# Patient Record
Sex: FEMALE | Race: WHITE | NOT HISPANIC OR LATINO | URBAN - METROPOLITAN AREA
[De-identification: names, ages, dates, MRNs, and addresses within clinical notes are randomized per-mention and may not be internally consistent; named-entity substitution may affect disease eponyms.]

---

## 2018-01-22 ENCOUNTER — APPOINTMENT (OUTPATIENT)
Dept: WOMENS IMAGING | Facility: HOSPITAL | Age: 43
End: 2018-01-22

## 2018-01-22 PROCEDURE — 77067 SCR MAMMO BI INCL CAD: CPT | Performed by: RADIOLOGY

## 2018-01-29 ENCOUNTER — APPOINTMENT (OUTPATIENT)
Dept: WOMENS IMAGING | Facility: HOSPITAL | Age: 43
End: 2018-01-29

## 2018-01-29 PROCEDURE — 77065 DX MAMMO INCL CAD UNI: CPT | Performed by: RADIOLOGY

## 2018-01-29 PROCEDURE — 76641 ULTRASOUND BREAST COMPLETE: CPT | Performed by: RADIOLOGY

## 2019-02-07 ENCOUNTER — APPOINTMENT (OUTPATIENT)
Dept: WOMENS IMAGING | Facility: HOSPITAL | Age: 44
End: 2019-02-07

## 2019-02-07 PROCEDURE — 77063 BREAST TOMOSYNTHESIS BI: CPT | Performed by: RADIOLOGY

## 2019-02-07 PROCEDURE — 77067 SCR MAMMO BI INCL CAD: CPT | Performed by: RADIOLOGY

## 2020-02-10 ENCOUNTER — APPOINTMENT (OUTPATIENT)
Dept: WOMENS IMAGING | Facility: HOSPITAL | Age: 45
End: 2020-02-10

## 2020-02-10 PROCEDURE — 77063 BREAST TOMOSYNTHESIS BI: CPT | Performed by: RADIOLOGY

## 2020-02-10 PROCEDURE — 77067 SCR MAMMO BI INCL CAD: CPT | Performed by: RADIOLOGY

## 2021-04-21 ENCOUNTER — APPOINTMENT (OUTPATIENT)
Dept: WOMENS IMAGING | Facility: HOSPITAL | Age: 46
End: 2021-04-21

## 2021-04-21 PROCEDURE — 77067 SCR MAMMO BI INCL CAD: CPT | Performed by: RADIOLOGY

## 2021-04-21 PROCEDURE — 77063 BREAST TOMOSYNTHESIS BI: CPT | Performed by: RADIOLOGY

## 2021-10-12 NOTE — TELEPHONE ENCOUNTER
Hub to read:  Called patient to let her know that she will need to make an appointment and be seen before a refill can be sent in. Asked her to call bacl to schedule with one of the providers since Connie is on maternity leave.

## 2021-10-14 RX ORDER — ROSUVASTATIN CALCIUM 10 MG/1
TABLET, COATED ORAL
Qty: 30 TABLET | Refills: 5 | Status: SHIPPED | OUTPATIENT
Start: 2021-10-14 | End: 2021-11-10 | Stop reason: SDUPTHER

## 2021-10-14 NOTE — TELEPHONE ENCOUNTER
PT HAS APPT ON 10/28; ASKING FOR PARTIAL REFILL      Rx Refill Note  Requested Prescriptions     Pending Prescriptions Disp Refills   • rosuvastatin (CRESTOR) 10 MG tablet [Pharmacy Med Name: ROSUVASTATIN CALCIUM 10 MG TAB] 30 tablet 5     Sig: TAKE 1 TAB DAILY FOR CHOLESTEROL      Last office visit with prescribing clinician: Visit date not found      Next office visit with prescribing clinician: Visit date not found          {TIP  Is Refill Pharmacy correct?:23}  Samantha Burton MA  10/14/21, 11:35 EDT

## 2021-10-14 NOTE — TELEPHONE ENCOUNTER
PATIENT CALLED, MADE APPT WITH DR MACIAS FOR 10/28 SINCE HAS WORK CONFLICTS NEXT WEEK, REQUESTED A PARTIAL REFILL OF ROSUVASTATIN CALCIUM 10 MG TAB UNTIL NEXT APPT.    PLEASE ADVISE    [phone]

## 2021-11-10 ENCOUNTER — OFFICE VISIT (OUTPATIENT)
Dept: INTERNAL MEDICINE | Facility: CLINIC | Age: 46
End: 2021-11-10

## 2021-11-10 VITALS
HEIGHT: 68 IN | BODY MASS INDEX: 36.07 KG/M2 | TEMPERATURE: 97.1 F | SYSTOLIC BLOOD PRESSURE: 156 MMHG | WEIGHT: 238 LBS | RESPIRATION RATE: 16 BRPM | HEART RATE: 82 BPM | DIASTOLIC BLOOD PRESSURE: 96 MMHG | OXYGEN SATURATION: 98 %

## 2021-11-10 DIAGNOSIS — Z11.59 ENCOUNTER FOR HCV SCREENING TEST FOR LOW RISK PATIENT: ICD-10-CM

## 2021-11-10 DIAGNOSIS — E78.2 MIXED HYPERLIPIDEMIA: Primary | ICD-10-CM

## 2021-11-10 DIAGNOSIS — Z23 ENCOUNTER FOR IMMUNIZATION: ICD-10-CM

## 2021-11-10 DIAGNOSIS — R03.0 SINGLE EPISODE OF ELEVATED BLOOD PRESSURE: ICD-10-CM

## 2021-11-10 DIAGNOSIS — Z11.4 ENCOUNTER FOR SCREENING FOR HIV: ICD-10-CM

## 2021-11-10 PROCEDURE — 90686 IIV4 VACC NO PRSV 0.5 ML IM: CPT | Performed by: INTERNAL MEDICINE

## 2021-11-10 PROCEDURE — 90471 IMMUNIZATION ADMIN: CPT | Performed by: INTERNAL MEDICINE

## 2021-11-10 PROCEDURE — 99204 OFFICE O/P NEW MOD 45 MIN: CPT | Performed by: INTERNAL MEDICINE

## 2021-11-10 RX ORDER — ROSUVASTATIN CALCIUM 10 MG/1
10 TABLET, COATED ORAL NIGHTLY
Qty: 90 TABLET | Refills: 5 | Status: SHIPPED | OUTPATIENT
Start: 2021-11-10 | End: 2023-03-24 | Stop reason: SDUPTHER

## 2021-11-10 NOTE — PROGRESS NOTES
"Chief Complaint  Follow-up, Med Refill, and Hyperlipidemia    Subjective          Kimmy Griffin presents to [facility]  HLD, HTN, doing well, no chest pain, sob, headaches, vision changes, no lows; not checking BP regularly at home      Objective   Vital Signs:   /96   Pulse 82   Temp 97.1 °F (36.2 °C)   Resp 16   Ht 172.7 cm (68\")   Wt 108 kg (238 lb)   SpO2 98%   BMI 36.19 kg/m²     Physical Exam  Vitals and nursing note reviewed.   Constitutional:       Appearance: Normal appearance.   HENT:      Head: Normocephalic and atraumatic.      Right Ear: Tympanic membrane, ear canal and external ear normal.      Left Ear: Tympanic membrane, ear canal and external ear normal.      Nose: Nose normal.      Mouth/Throat:      Mouth: Mucous membranes are moist.      Pharynx: Oropharynx is clear.   Eyes:      Extraocular Movements: Extraocular movements intact.      Conjunctiva/sclera: Conjunctivae normal.      Pupils: Pupils are equal, round, and reactive to light.   Cardiovascular:      Rate and Rhythm: Normal rate and regular rhythm.      Pulses: Normal pulses.      Heart sounds: Normal heart sounds.   Pulmonary:      Effort: Pulmonary effort is normal.      Breath sounds: Normal breath sounds. No wheezing, rhonchi or rales.   Abdominal:      General: Abdomen is flat. There is no distension.      Palpations: Abdomen is soft.      Tenderness: There is no abdominal tenderness.   Musculoskeletal:         General: Normal range of motion.      Cervical back: Normal range of motion and neck supple.      Right lower leg: No edema.      Left lower leg: No edema.   Skin:     General: Skin is warm and dry.      Capillary Refill: Capillary refill takes less than 2 seconds.      Findings: No rash.   Neurological:      General: No focal deficit present.      Mental Status: She is alert and oriented to person, place, and time. Mental status is at baseline.   Psychiatric:         " Mood and Affect: Mood normal.         Behavior: Behavior normal.        Result Review :                 Assessment and Plan    Diagnoses and all orders for this visit:    1. Mixed hyperlipidemia (Primary)  -     Comprehensive Metabolic Panel  -     Lipid Panel With / Chol / HDL Ratio  -     Hemoglobin A1c    2. Single episode of elevated blood pressure  -     Hemoglobin A1c    3. Encounter for screening for HIV  -     HIV-1 / O / 2 Ag / Antibody 4th Generation    4. Encounter for HCV screening test for low risk patient  -     Hepatitis C Antibody    Other orders  -     FluLaval/Fluarix/Fluzone >6 Months ([id number])    - check labs as above  - continue rosuvastatin 10mg daily, discussed risks and benefits  - rtc t ofollow up, worsening, change in illness  - counseled on goal BP, she will keep log and rtc to follow up few months for wellness exam      Follow Up   No follow-ups on file.  Patient was given instructions and counseling regarding her condition or for health maintenance advice. Please see specific information pulled into the AVS if appropriate.       Answers for HPI/ROS submitted by the patient on 11/10/2021  Please describe your symptoms.: Regular check-up to renew my cholesterol medicine.  Have you had these symptoms before?: No  How long have you been having these symptoms?: Greater than 2 weeks  Please list any medications you are currently taking for this condition.: Cholestrol medicine  What is the primary reason for your visit?: Other

## 2021-11-11 LAB
ALBUMIN SERPL-MCNC: 4.7 G/DL (ref 3.8–4.8)
ALBUMIN/GLOB SERPL: 1.7 {RATIO} (ref 1.2–2.2)
ALP SERPL-CCNC: 82 IU/L (ref 44–121)
ALT SERPL-CCNC: 32 IU/L (ref 0–32)
AST SERPL-CCNC: 24 IU/L (ref 0–40)
BILIRUB SERPL-MCNC: 0.5 MG/DL (ref 0–1.2)
BUN SERPL-MCNC: 14 MG/DL (ref 6–24)
BUN/CREAT SERPL: 19 (ref 9–23)
CALCIUM SERPL-MCNC: 9.4 MG/DL (ref 8.7–10.2)
CHLORIDE SERPL-SCNC: 104 MMOL/L (ref 96–106)
CHOLEST SERPL-MCNC: 203 MG/DL (ref 100–199)
CHOLEST/HDLC SERPL: 5.3 RATIO (ref 0–4.4)
CO2 SERPL-SCNC: 23 MMOL/L (ref 20–29)
CREAT SERPL-MCNC: 0.73 MG/DL (ref 0.57–1)
GLOBULIN SER CALC-MCNC: 2.7 G/DL (ref 1.5–4.5)
GLUCOSE SERPL-MCNC: 107 MG/DL (ref 65–99)
HBA1C MFR BLD: 5.7 % (ref 4.8–5.6)
HCV AB S/CO SERPL IA: <0.1 S/CO RATIO (ref 0–0.9)
HDLC SERPL-MCNC: 38 MG/DL
HIV 1+2 AB+HIV1 P24 AG SERPL QL IA: NON REACTIVE
LDLC SERPL CALC-MCNC: 132 MG/DL (ref 0–99)
Lab: NORMAL
POTASSIUM SERPL-SCNC: 4.3 MMOL/L (ref 3.5–5.2)
PROT SERPL-MCNC: 7.4 G/DL (ref 6–8.5)
SODIUM SERPL-SCNC: 142 MMOL/L (ref 134–144)
TRIGL SERPL-MCNC: 183 MG/DL (ref 0–149)
VLDLC SERPL CALC-MCNC: 33 MG/DL (ref 5–40)

## 2022-08-03 ENCOUNTER — APPOINTMENT (OUTPATIENT)
Dept: WOMENS IMAGING | Facility: HOSPITAL | Age: 47
End: 2022-08-03

## 2022-08-03 PROCEDURE — 77067 SCR MAMMO BI INCL CAD: CPT | Performed by: RADIOLOGY

## 2022-08-03 PROCEDURE — 77063 BREAST TOMOSYNTHESIS BI: CPT | Performed by: RADIOLOGY

## 2023-03-23 ENCOUNTER — OFFICE VISIT (OUTPATIENT)
Dept: INTERNAL MEDICINE | Facility: CLINIC | Age: 48
End: 2023-03-23
Payer: COMMERCIAL

## 2023-03-23 VITALS
HEART RATE: 92 BPM | DIASTOLIC BLOOD PRESSURE: 92 MMHG | WEIGHT: 242 LBS | HEIGHT: 68 IN | TEMPERATURE: 97.2 F | RESPIRATION RATE: 16 BRPM | SYSTOLIC BLOOD PRESSURE: 140 MMHG | BODY MASS INDEX: 36.68 KG/M2 | OXYGEN SATURATION: 99 %

## 2023-03-23 DIAGNOSIS — E78.2 MIXED HYPERLIPIDEMIA: Primary | ICD-10-CM

## 2023-03-23 DIAGNOSIS — R03.0 ELEVATED BLOOD PRESSURE READING IN OFFICE WITHOUT DIAGNOSIS OF HYPERTENSION: ICD-10-CM

## 2023-03-23 DIAGNOSIS — R73.03 PREDIABETES: ICD-10-CM

## 2023-03-23 LAB
ALBUMIN SERPL-MCNC: 4.9 G/DL (ref 3.5–5.2)
ALBUMIN/GLOB SERPL: 2.1 G/DL
ALP SERPL-CCNC: 81 U/L (ref 39–117)
ALT SERPL-CCNC: 27 U/L (ref 1–33)
AST SERPL-CCNC: 18 U/L (ref 1–32)
BILIRUB SERPL-MCNC: 0.4 MG/DL (ref 0–1.2)
BUN SERPL-MCNC: 18 MG/DL (ref 6–20)
BUN/CREAT SERPL: 25 (ref 7–25)
CALCIUM SERPL-MCNC: 9.5 MG/DL (ref 8.6–10.5)
CHLORIDE SERPL-SCNC: 105 MMOL/L (ref 98–107)
CHOLEST SERPL-MCNC: 231 MG/DL (ref 0–200)
CO2 SERPL-SCNC: 24.9 MMOL/L (ref 22–29)
CREAT SERPL-MCNC: 0.72 MG/DL (ref 0.57–1)
EGFRCR SERPLBLD CKD-EPI 2021: 102.6 ML/MIN/1.73
GLOBULIN SER CALC-MCNC: 2.3 GM/DL
GLUCOSE SERPL-MCNC: 108 MG/DL (ref 65–99)
HBA1C MFR BLD: 5.8 % (ref 4.8–5.6)
HDLC SERPL-MCNC: 36 MG/DL (ref 40–60)
LDLC SERPL CALC-MCNC: 159 MG/DL (ref 0–100)
POTASSIUM SERPL-SCNC: 4.2 MMOL/L (ref 3.5–5.2)
PROT SERPL-MCNC: 7.2 G/DL (ref 6–8.5)
SODIUM SERPL-SCNC: 142 MMOL/L (ref 136–145)
TRIGL SERPL-MCNC: 196 MG/DL (ref 0–150)
VLDLC SERPL CALC-MCNC: 36 MG/DL (ref 5–40)

## 2023-03-23 NOTE — ASSESSMENT & PLAN NOTE
CONTROLLED  - FLP today for ongoing monitoring  - cont on current high intensity statin, will adjust dose as indicated based on labs  - cont with good diet and lifestyle changes including increased exercise, low chol and low fat diet, and increased fiber  - has had some hyperglycemia with statins in the past, monitoring closely on new statin to see if that is still the case

## 2023-03-23 NOTE — PROGRESS NOTES
"Chief Complaint  Follow-up and Hyperlipidemia    Subjective          Kimmy Griffin presents to [facility] for follow up and med refills. Pt taking all medications daily as prescribed with good reported compliance. No issues or side effects with meds.   Pt has been off and on chol medication for a while, most recently started back 6-12 months ago, maybe longer. No side effects from meds.     Objective   Vital Signs:     /92   Pulse 92   Temp 97.2 °F (36.2 °C)   Resp 16   Ht 172.7 cm (68\")   Wt 110 kg (242 lb)   SpO2 99%   BMI 36.80 kg/m²     Physical Exam  Vitals and nursing note reviewed.   Constitutional:       General: She is not in acute distress.     Appearance: Normal appearance.   Cardiovascular:      Rate and Rhythm: Normal rate and regular rhythm.      Pulses: Normal pulses.      Heart sounds: Normal heart sounds. No murmur heard.  Pulmonary:      Effort: Pulmonary effort is normal. No respiratory distress.      Breath sounds: Normal breath sounds.   Abdominal:      General: Abdomen is flat. Bowel sounds are normal.      Palpations: Abdomen is soft.      Tenderness: There is no abdominal tenderness.   Musculoskeletal:      Right lower leg: No edema.      Left lower leg: No edema.   Neurological:      Mental Status: She is alert and oriented to person, place, and time. Mental status is at baseline.   Psychiatric:         Mood and Affect: Mood normal.         Behavior: Behavior normal.          Result Review :   The following data was reviewed by: Christine Rosales MD on 03/23/2023:  Labs:  Comprehensive Metabolic Panel (11/10/2021 00:00)   Lipid Panel With / Chol / HDL Ratio (11/10/2021 00:00)   Hemoglobin A1c (11/10/2021 00:00)              Assessment and Plan      Diagnoses and all orders for this visit:    1. Mixed hyperlipidemia (Primary)  Assessment & Plan:  CONTROLLED  - FLP today for ongoing monitoring  - cont on current high intensity statin, " will adjust dose as indicated based on labs  - cont with good diet and lifestyle changes including increased exercise, low chol and low fat diet, and increased fiber  - has had some hyperglycemia with statins in the past, monitoring closely on new statin to see if that is still the case      Orders:  -     Comprehensive Metabolic Panel  -     Lipid Panel    2. Prediabetes  -     Comprehensive Metabolic Panel  -     Hemoglobin A1c    3. Elevated blood pressure reading in office without diagnosis of hypertension    - BP slightly elevated in office today, actually down from last reading, pt suspects it is mostly related to white coat HTN  - will begin monitoring routinely at home and call if values trend >140/90    Follow Up   Return in about 6 months (around 9/23/2023) for Annual physical.    Patient was given instructions and counseling regarding her condition or for health maintenance advice. Please see specific information pulled into the AVS if appropriate.     Rivka Rosales MD  [facility]  Phone: [phone]  Fax: [phone]

## 2023-03-24 RX ORDER — ROSUVASTATIN CALCIUM 10 MG/1
10 TABLET, COATED ORAL NIGHTLY
Qty: 90 TABLET | Refills: 3 | Status: SHIPPED | OUTPATIENT
Start: 2023-03-24

## 2023-11-01 ENCOUNTER — APPOINTMENT (OUTPATIENT)
Dept: WOMENS IMAGING | Facility: HOSPITAL | Age: 48
End: 2023-11-01
Payer: COMMERCIAL

## 2023-11-01 PROCEDURE — 77067 SCR MAMMO BI INCL CAD: CPT | Performed by: RADIOLOGY

## 2023-11-01 PROCEDURE — 77063 BREAST TOMOSYNTHESIS BI: CPT | Performed by: RADIOLOGY

## 2024-05-03 ENCOUNTER — OFFICE VISIT (OUTPATIENT)
Dept: FAMILY MEDICINE CLINIC | Facility: CLINIC | Age: 49
End: 2024-05-03
Payer: COMMERCIAL

## 2024-05-03 VITALS
WEIGHT: 228 LBS | SYSTOLIC BLOOD PRESSURE: 140 MMHG | HEART RATE: 60 BPM | HEIGHT: 68 IN | BODY MASS INDEX: 34.56 KG/M2 | DIASTOLIC BLOOD PRESSURE: 90 MMHG | TEMPERATURE: 97.7 F | OXYGEN SATURATION: 98 %

## 2024-05-03 DIAGNOSIS — Z83.719 FAMILY HISTORY OF POLYPS IN THE COLON: ICD-10-CM

## 2024-05-03 DIAGNOSIS — E78.2 MIXED HYPERLIPIDEMIA: ICD-10-CM

## 2024-05-03 DIAGNOSIS — Z12.11 COLON CANCER SCREENING: ICD-10-CM

## 2024-05-03 DIAGNOSIS — R03.0 ELEVATED BLOOD PRESSURE READING IN OFFICE WITHOUT DIAGNOSIS OF HYPERTENSION: ICD-10-CM

## 2024-05-03 DIAGNOSIS — R73.03 PREDIABETES: ICD-10-CM

## 2024-05-03 DIAGNOSIS — Z76.89 ENCOUNTER TO ESTABLISH CARE: ICD-10-CM

## 2024-05-03 DIAGNOSIS — Z00.00 ANNUAL PHYSICAL EXAM: Primary | ICD-10-CM

## 2024-05-03 RX ORDER — ROSUVASTATIN CALCIUM 10 MG/1
10 TABLET, COATED ORAL NIGHTLY
Qty: 90 TABLET | Refills: 3 | Status: SHIPPED | OUTPATIENT
Start: 2024-05-03

## 2024-05-03 NOTE — PROGRESS NOTES
Patient ID: Kimmy Griffin is a 48 y.o. female     Patient Care Team:  Head, MARICRUZ aYng as PCP - General (Nurse Practitioner)    Subjective     Chief Complaint   Patient presents with    [facility]    Annual Exam    Hyperlipidemia       History of Present Illness    Kimmy Griffin was a patient of Dr. Rosales's who is no longer with [facility].  I will be taking over this patient's primary care. Last seen 3/23/2023.  This is the first time patient is seeing me.     She presents to [facility] today for annual physical and management of hyperlipidemia.   C/O easy bruising to lower legs.  She does take aspirin 81 mg daily and has been for years.      Health Habits:  Dental Exam: Up to date  Eye Exam: Not UTD.    Diet:  Intermittent fasting - had lost 30 pounds in past.  Has lost 8-10 pounds since March.   Exercise: 3 times/week.  Current exercise activities include: walking  Pap:  All Women < 3 years. Early menopause.    Mammogram:  UTD.    Dexa: N/A    Colonoscopy: Never  - Intermittent issues of bleeding with hard stools.    Works from home as a .    She denies any complaints of fever, chills, cough, chest pain, shortness of air, abdominal pain, nausea, or any other concerns.     The following portions of the patient's history were reviewed and updated as appropriate: allergies, current medications, past family history, past medical history, past social history, past surgical history and problem list.       ROS    Vitals:    05/03/24 0926   BP: 140/90   Pulse: 60   Temp: 97.7 °F (36.5 °C)   SpO2: 98%       Documented weights    05/03/24 0926   Weight: 103 kg (228 lb)     Body mass index is 34.67 kg/m².    Results for orders placed or performed in visit on 03/23/23   Comprehensive Metabolic Panel    Specimen: Blood   Result Value Ref Range    Glucose 108 (H) 65 - 99 mg/dL    BUN 18 6 - 20 mg/dL    Creatinine 0.72 0.57 - 1.00 mg/dL    EGFR Result 102.6 >60.0  mL/min/1.73    BUN/Creatinine Ratio 25.0 7.0 - 25.0    Sodium 142 136 - 145 mmol/L    Potassium 4.2 3.5 - 5.2 mmol/L    Chloride 105 98 - 107 mmol/L    Total CO2 24.9 22.0 - 29.0 mmol/L    Calcium 9.5 8.6 - 10.5 mg/dL    Total Protein 7.2 6.0 - 8.5 g/dL    Albumin 4.9 3.5 - 5.2 g/dL    Globulin 2.3 gm/dL    A/G Ratio 2.1 g/dL    Total Bilirubin 0.4 0.0 - 1.2 mg/dL    Alkaline Phosphatase 81 39 - 117 U/L    AST (SGOT) 18 1 - 32 U/L    ALT (SGPT) 27 1 - 33 U/L   Hemoglobin A1c    Specimen: Blood   Result Value Ref Range    Hemoglobin A1C 5.80 (H) 4.80 - 5.60 %   Lipid Panel    Specimen: Blood   Result Value Ref Range    Total Cholesterol 231 (H) 0 - 200 mg/dL    Triglycerides 196 (H) 0 - 150 mg/dL    HDL Cholesterol 36 (L) 40 - 60 mg/dL    VLDL Cholesterol Javier 36 5 - 40 mg/dL    LDL Chol Calc (NIH) 159 (H) 0 - 100 mg/dL           Objective     Physical Exam  Constitutional:       Appearance: She is well-developed.   HENT:      Head: Normocephalic and atraumatic.      Right Ear: Tympanic membrane normal.      Left Ear: Tympanic membrane normal.   Eyes:      Pupils: Pupils are equal, round, and reactive to light.   Cardiovascular:      Rate and Rhythm: Normal rate and regular rhythm.      Heart sounds: Normal heart sounds. No murmur heard.     Comments: B/P rechecked at 142/88  Pulmonary:      Effort: Pulmonary effort is normal.      Breath sounds: Normal breath sounds. No wheezing, rhonchi or rales.   Abdominal:      General: Bowel sounds are normal.      Palpations: Abdomen is soft.      Tenderness: There is no abdominal tenderness.   Musculoskeletal:         General: No tenderness. Normal range of motion.      Cervical back: Normal range of motion and neck supple.   Skin:     General: Skin is warm and dry.      Findings: No erythema or rash.   Neurological:      Mental Status: She is alert and oriented to person, place, and time.   Psychiatric:         Behavior: Behavior normal.         BMI is >= 30 and <35. (Class 1  Obesity). The following options were offered after discussion;: exercise counseling/recommendations, nutrition counseling/recommendations, and information on healthy weight added to patient's after visit summary       Assessment & Plan     Assessment/Plan     Diagnoses and all orders for this visit:    1. Annual physical exam (Primary)  -     CBC (No Diff)  -     Comprehensive Metabolic Panel  -     Lipid Panel With / Chol / HDL Ratio  -     TSH Rfx On Abnormal To Free T4    2. Encounter to establish care    3. Mixed hyperlipidemia  -     Comprehensive Metabolic Panel  -     Lipid Panel With / Chol / HDL Ratio  -     rosuvastatin (CRESTOR) 10 MG tablet; Take 1 tablet by mouth Every Night.  Dispense: 90 tablet; Refill: 3    4. Prediabetes  -     Hemoglobin A1c    5. Colon cancer screening  -     Ambulatory Referral For Screening Colonoscopy    6. Family history of polyps in the colon  -     Ambulatory Referral For Screening Colonoscopy    7. Elevated blood pressure reading in office without diagnosis of hypertension        Summary:  Kimmy Griffin presented to office today to establish care with our practice.  She is fasting today.  Will obtain routine annual physical labs including hemoglobin A1c.  Will notify her of results.  No changes in medications at this time.  Advise she may stop baby aspirin due to easily bruising and problems with what sounds to be hemorrhoids.  Also advised to increase fiber and water intake to help with this.  She may also take MiraLAX as needed.  Placed order for screening colonoscopy.  Blood pressure slightly elevated in office today at 140/90.  She states will be elevated in doctors offices however stable at home.  Advised to continue to monitor blood pressure at home.  Let us know if it stays greater than 140/90.      Follow Up:  Return for Labs today - F/U 1 year physical  with fasting labs week before. .    In the meantime, instructed to contact us sooner for any problems or  concerns.    Patient was given instructions and counseling regarding condition or for health maintenance advice.  Please see specific information pulled into the AVS if appropriate.          Kimmy Ruiz, APRN  Family Medicine  [facility]  05/03/24  10:13 EDT

## 2024-05-04 LAB
ALBUMIN SERPL-MCNC: 4.9 G/DL (ref 3.5–5.2)
ALBUMIN/GLOB SERPL: 2 G/DL
ALP SERPL-CCNC: 81 U/L (ref 39–117)
ALT SERPL-CCNC: 29 U/L (ref 1–33)
AST SERPL-CCNC: 21 U/L (ref 1–32)
BILIRUB SERPL-MCNC: 0.4 MG/DL (ref 0–1.2)
BUN SERPL-MCNC: 17 MG/DL (ref 6–20)
BUN/CREAT SERPL: 25 (ref 7–25)
CALCIUM SERPL-MCNC: 9.5 MG/DL (ref 8.6–10.5)
CHLORIDE SERPL-SCNC: 103 MMOL/L (ref 98–107)
CHOLEST SERPL-MCNC: 206 MG/DL (ref 0–200)
CHOLEST/HDLC SERPL: 5.89 {RATIO}
CO2 SERPL-SCNC: 26.1 MMOL/L (ref 22–29)
CREAT SERPL-MCNC: 0.68 MG/DL (ref 0.57–1)
EGFRCR SERPLBLD CKD-EPI 2021: 106.3 ML/MIN/1.73
ERYTHROCYTE [DISTWIDTH] IN BLOOD BY AUTOMATED COUNT: 13.2 % (ref 12.3–15.4)
GLOBULIN SER CALC-MCNC: 2.4 GM/DL
GLUCOSE SERPL-MCNC: 105 MG/DL (ref 65–99)
HBA1C MFR BLD: 5.8 % (ref 4.8–5.6)
HCT VFR BLD AUTO: 40.9 % (ref 34–46.6)
HDLC SERPL-MCNC: 35 MG/DL (ref 40–60)
HGB BLD-MCNC: 13.5 G/DL (ref 12–15.9)
LDLC SERPL CALC-MCNC: 135 MG/DL (ref 0–100)
MCH RBC QN AUTO: 30.2 PG (ref 26.6–33)
MCHC RBC AUTO-ENTMCNC: 33 G/DL (ref 31.5–35.7)
MCV RBC AUTO: 91.5 FL (ref 79–97)
PLATELET # BLD AUTO: 220 10*3/MM3 (ref 140–450)
POTASSIUM SERPL-SCNC: 4.1 MMOL/L (ref 3.5–5.2)
PROT SERPL-MCNC: 7.3 G/DL (ref 6–8.5)
RBC # BLD AUTO: 4.47 10*6/MM3 (ref 3.77–5.28)
SODIUM SERPL-SCNC: 140 MMOL/L (ref 136–145)
TRIGL SERPL-MCNC: 200 MG/DL (ref 0–150)
TSH SERPL DL<=0.005 MIU/L-ACNC: 2.09 UIU/ML (ref 0.27–4.2)
VLDLC SERPL CALC-MCNC: 36 MG/DL (ref 5–40)
WBC # BLD AUTO: 4.84 10*3/MM3 (ref 3.4–10.8)

## 2024-10-07 ENCOUNTER — PREP FOR SURGERY (OUTPATIENT)
Dept: OTHER | Facility: HOSPITAL | Age: 49
End: 2024-10-07
Payer: COMMERCIAL

## 2024-10-07 DIAGNOSIS — Z12.11 SCREENING FOR COLON CANCER: Primary | ICD-10-CM

## 2024-11-13 PROBLEM — Z12.11 SCREENING FOR COLON CANCER: Status: ACTIVE | Noted: 2024-10-07

## 2025-01-09 ENCOUNTER — ANESTHESIA (OUTPATIENT)
Dept: GASTROENTEROLOGY | Facility: HOSPITAL | Age: 50
End: 2025-01-09
Payer: COMMERCIAL

## 2025-01-09 ENCOUNTER — HOSPITAL ENCOUNTER (OUTPATIENT)
Facility: HOSPITAL | Age: 50
Setting detail: HOSPITAL OUTPATIENT SURGERY
Discharge: HOME | End: 2025-01-09
Attending: SURGERY | Admitting: SURGERY
Payer: COMMERCIAL

## 2025-01-09 ENCOUNTER — ANESTHESIA EVENT (OUTPATIENT)
Dept: GASTROENTEROLOGY | Facility: HOSPITAL | Age: 50
End: 2025-01-09
Payer: COMMERCIAL

## 2025-01-09 VITALS
HEART RATE: 69 BPM | BODY MASS INDEX: 37.35 KG/M2 | OXYGEN SATURATION: 100 % | DIASTOLIC BLOOD PRESSURE: 85 MMHG | RESPIRATION RATE: 11 BRPM | WEIGHT: 238 LBS | SYSTOLIC BLOOD PRESSURE: 155 MMHG | HEIGHT: 67 IN

## 2025-01-09 DIAGNOSIS — Z12.11 SCREENING FOR COLON CANCER: ICD-10-CM

## 2025-01-09 PROBLEM — K63.5 COLON POLYPS: Status: ACTIVE | Noted: 2025-01-09

## 2025-01-09 PROCEDURE — S0260 H&P FOR SURGERY: HCPCS | Performed by: SURGERY

## 2025-01-09 PROCEDURE — 25010000002 PROPOFOL 1000 MG/100ML EMULSION: Performed by: ANESTHESIOLOGY

## 2025-01-09 PROCEDURE — 25810000003 LACTATED RINGERS PER 1000 ML: Performed by: SURGERY

## 2025-01-09 PROCEDURE — 25010000002 PROPOFOL 10 MG/ML EMULSION: Performed by: ANESTHESIOLOGY

## 2025-01-09 PROCEDURE — 45380 COLONOSCOPY AND BIOPSY: CPT | Performed by: SURGERY

## 2025-01-09 PROCEDURE — 45385 COLONOSCOPY W/LESION REMOVAL: CPT | Performed by: SURGERY

## 2025-01-09 PROCEDURE — 25010000002 LIDOCAINE 1 % SOLUTION: Performed by: ANESTHESIOLOGY

## 2025-01-09 PROCEDURE — 88305 TISSUE EXAM BY PATHOLOGIST: CPT | Performed by: SURGERY

## 2025-01-09 RX ORDER — PROPOFOL 10 MG/ML
INJECTION, EMULSION INTRAVENOUS AS NEEDED
Status: DISCONTINUED | OUTPATIENT
Start: 2025-01-09 | End: 2025-01-09 | Stop reason: SURG

## 2025-01-09 RX ORDER — LIDOCAINE HYDROCHLORIDE 10 MG/ML
INJECTION, SOLUTION INFILTRATION; PERINEURAL AS NEEDED
Status: DISCONTINUED | OUTPATIENT
Start: 2025-01-09 | End: 2025-01-09 | Stop reason: SURG

## 2025-01-09 RX ORDER — SODIUM CHLORIDE 0.9 % (FLUSH) 0.9 %
10 SYRINGE (ML) INJECTION AS NEEDED
Status: DISCONTINUED | OUTPATIENT
Start: 2025-01-09 | End: 2025-01-09 | Stop reason: HOSPADM

## 2025-01-09 RX ORDER — SODIUM CHLORIDE, SODIUM LACTATE, POTASSIUM CHLORIDE, CALCIUM CHLORIDE 600; 310; 30; 20 MG/100ML; MG/100ML; MG/100ML; MG/100ML
1000 INJECTION, SOLUTION INTRAVENOUS CONTINUOUS
Status: DISCONTINUED | OUTPATIENT
Start: 2025-01-09 | End: 2025-01-09 | Stop reason: HOSPADM

## 2025-01-09 RX ADMIN — PROPOFOL 250 MCG/KG/MIN: 10 INJECTION, EMULSION INTRAVENOUS at 10:29

## 2025-01-09 RX ADMIN — SODIUM CHLORIDE, SODIUM LACTATE, POTASSIUM CHLORIDE, CALCIUM CHLORIDE 1000 ML: 20; 30; 600; 310 INJECTION, SOLUTION INTRAVENOUS at 09:14

## 2025-01-09 RX ADMIN — LIDOCAINE HYDROCHLORIDE 40 MG: 10 INJECTION, SOLUTION INFILTRATION; PERINEURAL at 10:29

## 2025-01-09 RX ADMIN — PROPOFOL INJECTABLE EMULSION 150 MG: 10 INJECTION, EMULSION INTRAVENOUS at 10:29

## 2025-01-09 NOTE — H&P
General Surgery  History and Physical    CC: Screening for colon cancer    HPI: The patient is a pleasant 49 y.o. year-old lady who presents today for routine screening colonoscopy.  She has never previously undergone colonoscopic evaluation and denies any melena or hematochezia.  She has no family history of colon cancer.    Past Medical History:   GERD  Migraine headaches  Hyperlipidemia    Past Surgical History:   Cholecystectomy  [place] tooth extraction    Medications:   Medications Prior to Admission   Medication Sig Dispense Refill Last Dose/Taking    melatonin 1 MG tablet Take 3 tablets by mouth Every Night.   Taking    multivitamin with minerals (MULTIVITAMIN ADULT PO) Take 1 tablet by mouth Daily. PT HOLDING FOR SURGERY   1/8/2025 Morning    omeprazole (priLOSEC) 20 MG capsule Take 1 capsule by mouth Daily.   Taking    rosuvastatin (CRESTOR) 10 MG tablet Take 1 tablet by mouth Every Night. 90 tablet 3 Taking       Allergies: Codeine    Family History: No family history of gastrointestinal malignancy in her parents or siblings    Social History: , non-smoker, weekly social alcohol use    ROS: A comprehensive review of systems was conducted and negative for melena or hematochezia  All other systems reviewed and negative    Physical Exam:  Vitals:    01/09/25 0906   BP: 159/87   Pulse: 75   Resp: 11   SpO2: 97%   Height: 172 cm  Weight: 108 kg  BMI: 37  General: No acute distress, well-nourished & well-developed  HEAD: normocephalic, atraumatic  EYES: normal conjunctiva, sclera anicteric  EARS: grossly normal hearing  NECK: supple, no thyromegaly  CARDIOVASCULAR: regular rate and rhythm  RESPIRATORY: clear to auscultation bilaterally  GASTROINTESTINAL: soft, nontender, non-distended  PSYCHIATRIC: oriented x3, normal mood and affect    ASSESSMENT & PLAN  Mrs. Griffin is a 49-year-old lady here for routine screening colonoscopy.  She has been counseled on the risks of the procedure which include but are  not limited to bleeding, colon perforation, and missed pathology.  Despite these risks, she has consented to proceed.    Catalina Barney MD  General, Robotic, and Endoscopic Surgery  [facility] [place]    [address], [address]  [place] [address]  P: [phone]  F: [phone]

## 2025-01-09 NOTE — ANESTHESIA PREPROCEDURE EVALUATION
Anesthesia Evaluation     Patient summary reviewed and Nursing notes reviewed   NPO Solid Status: > 8 hours  NPO Liquid Status: > 2 hours           Airway   Mallampati: I  TM distance: >3 FB  Neck ROM: full  No difficulty expected  Dental - normal exam     Pulmonary - negative pulmonary ROS   Cardiovascular   Exercise tolerance: good (4-7 METS)    Rhythm: regular    (+) hyperlipidemia      Neuro/Psych- negative ROS  GI/Hepatic/Renal/Endo    (+) GERD    Musculoskeletal (-) negative ROS    Abdominal    Substance History - negative use     OB/GYN negative ob/gyn ROS         Other                    Anesthesia Plan    ASA 2     MAC     intravenous induction     Anesthetic plan, risks, benefits, and alternatives have been provided, discussed and informed consent has been obtained with: patient.    CODE STATUS:

## 2025-01-09 NOTE — DISCHARGE INSTRUCTIONS
For the next 24 hours patient needs to be with a responsible adult.    For 24 hours DO NOT drive, operate machinery, appliances, drink alcohol, make important decisions or sign legal documents.    Start with a light or bland diet if you are feeling sick to your stomach otherwise advance to regular diet as tolerated.    Follow recommendations on procedure report if provided by your doctor.    Call Dr Barney for problems [phone]    Problems may include but not limited to: large amounts of bleeding, trouble breathing, repeated vomiting, severe unrelieved pain, fever or chills.

## 2025-01-09 NOTE — OP NOTE
Operative Note :  Catalina Barney MD      Kimmy Griffin  1975    Procedure Date: 01/09/25    Pre-op Diagnosis:  Screening for colon cancer [Z12.11]    Post-Operative Diagnosis:  Colon polyps    Procedure:   Flexible colonoscopy to the cecum with hot snare polypectomy x5 and cold forcep polypectomy    Surgeon: Catalina Barney MD    Assistant: None    Anesthesia:  MAC (monitored anesthetic care)    Estimated Blood Loss: Minimal    Specimens:   Ascending colon polyp  Hepatic flexure polyp  Descending colon polyps x 2  Rectal polyps x 2    Complications: None    Indications:  Mrs. Griffin is a 49-year-old lady here for routine screening colonoscopy. She has been counseled on the risks of the procedure which include but are not limited to bleeding, colon perforation, and missed pathology. Despite these risks, she has consented to proceed.     Findings: 6 sessile colon polyps removed    Description of procedure:  The patient was brought to the endoscopy suite and jaclyn in the left lateral decubitus position.  Continuous propofol anesthesia was administered.  A surgical timeout was completed.  A digital rectal exam was performed, revealing no abnormalities.  An adult colonoscope was then inserted through the anus and passed under direct visualization to the level of the cecum.  The cecum was identified via the ileocecal valve as well as the appendiceal orifice.  The scope was then slowly withdrawn, examining all circumferential walls of the ascending, transverse, descending, and sigmoid colon.  There were 6 sessile colon polyps identified and removed.  The first was a 2 mm polyp of the ascending colon removed using the biopsy forceps.  Within the hepatic flexure there was a 5 mm polyp removed using the hot snare and retrieved.  Within the descending colon there were 2 separate 3 mm polyps each removed using the hot snare.  Within the rectum there were 2 separate 2 mm polyps each removed using the hot snare.  Along  the distal rectum the scope was retroflexed and showed no signs of hemorrhoidal disease.  The scope was then withdrawn and the colon desufflated.  The patient had a very good bowel prep and was transferred to the recovery area in stable condition.     Recommendations:  I will call the patient in 1 week or less with the pathology results of the 6 polyps removed as this will determine when the next screening colonoscopy will be due.    Catalina Barney MD  General, Robotic, and Endoscopic Surgery  [facility]    [address], [address]  [place] [address]  P: [phone]  F: [phone]

## 2025-01-09 NOTE — ANESTHESIA POSTPROCEDURE EVALUATION
Patient: Kimmy Griffin    Procedure Summary       Date: 01/09/25 Room / Location:  [facility] /  [facility]    Anesthesia Start: 1024 Anesthesia Stop: 1106    Procedure: COLONOSCOPY into cecum with cold biopsy polypectomy and hot snare polypectomies Diagnosis:       Screening for colon cancer      (Screening for colon cancer [Z12.11])    Surgeons: Catalina Barney MD Provider: Anjum Mederos MD    Anesthesia Type: MAC ASA Status: 2            Anesthesia Type: MAC    Vitals  Vitals Value Taken Time   /73 01/09/25 1104   Temp     Pulse 72 01/09/25 1106   Resp 16 01/09/25 1103   SpO2 99 % 01/09/25 1106   Vitals shown include unfiled device data.        Post Anesthesia Care and Evaluation    Patient location during evaluation: bedside  Patient participation: complete - patient participated  Level of consciousness: awake  Pain management: adequate    Airway patency: patent  Anesthetic complications: No anesthetic complications  PONV Status: none  Cardiovascular status: acceptable  Respiratory status: acceptable  Hydration status: acceptable  Post Neuraxial Block status: Motor and sensory function returned to baseline

## 2025-01-10 LAB
CYTO UR: NORMAL
LAB AP CASE REPORT: NORMAL
PATH REPORT.FINAL DX SPEC: NORMAL
PATH REPORT.GROSS SPEC: NORMAL

## 2025-01-15 ENCOUNTER — TELEPHONE (OUTPATIENT)
Dept: SURGERY | Facility: CLINIC | Age: 50
End: 2025-01-15
Payer: COMMERCIAL

## 2025-01-15 NOTE — TELEPHONE ENCOUNTER
I called Kimmy and discussed the benign pathology findings from her colonoscopy last week.  All 6 polyps returned as tubular adenomas.  I would recommend given the presence of 6 adenomatous polyps that she have a repeat screening colonoscopy in 3 years.  She expressed understanding.    Kayla, please update her health maintenance tab and recall pool to reflect a 3-year interval for colonoscopy.    Thanks,  PERRY

## 2025-03-03 ENCOUNTER — TELEPHONE (OUTPATIENT)
Dept: FAMILY MEDICINE CLINIC | Facility: CLINIC | Age: 50
End: 2025-03-03

## 2025-03-03 ENCOUNTER — OFFICE VISIT (OUTPATIENT)
Dept: FAMILY MEDICINE CLINIC | Facility: CLINIC | Age: 50
End: 2025-03-03
Payer: COMMERCIAL

## 2025-03-03 VITALS
TEMPERATURE: 97.6 F | HEIGHT: 67 IN | BODY MASS INDEX: 36.88 KG/M2 | WEIGHT: 235 LBS | DIASTOLIC BLOOD PRESSURE: 80 MMHG | OXYGEN SATURATION: 99 % | HEART RATE: 64 BPM | SYSTOLIC BLOOD PRESSURE: 128 MMHG

## 2025-03-03 DIAGNOSIS — S69.92XA HAND INJURY, LEFT, INITIAL ENCOUNTER: Primary | ICD-10-CM

## 2025-03-03 PROCEDURE — 99213 OFFICE O/P EST LOW 20 MIN: CPT | Performed by: NURSE PRACTITIONER

## 2025-03-03 NOTE — PROGRESS NOTES
"Subjective     Chief Complaint   Patient presents with    Fall     Hand pain - fell on left , bruised and puffy. Thumb area is still very sore        Kimmy ANISHA Griffin is a 49 y.o. female who presents for evaluation of left hand/finger pain. Onset was sudden, related to pulling dog away from her dog which was being attacked.. Mechanism of injury: contusion. The pain is moderate, worsens with movement, and is relieved by rest. There is no associated numbness, tingling in fingers. Evaluation to date: none. Treatment to date: OTC analgesics, ice, avoidance of offending activity.  Initial swelling and redness which has improved however pain and bruising remains.    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      The following portions of the patient's history were reviewed and updated as appropriate: allergies, current medications, past family history, past medical history, past social history, past surgical history, and problem list.      Objective    /80 (BP Location: Left arm, Patient Position: Sitting, Cuff Size: Adult)   Pulse 64   Temp 97.6 °F (36.4 °C)   Ht 170.2 cm (67\")   Wt 107 kg (235 lb)   SpO2 99%   BMI 36.81 kg/m²   Right hand:  normal exam, no swelling, tenderness, instability; ligaments intact, full ROM both hands, wrists, and finger joints   Left hand:  soft tissue tenderness and swelling at the dorsal and palmar aspect of left hand primarily at base of left thumb and radial pulse normal   Thumb to finger dexterity intact with mild discomfort.      Imaging:  X-ray left hand: ordered, but results not yet available     Assessment & Plan Hand sprain       Diagnosis Plan   1. Hand injury, left, initial encounter  XR Hand 3+ View Left          Natural history and expected course discussed. Questions answered.  Rest, ice, compression, and elevation (RICE) therapy.  Reduction in offending activity discussed.  Volar wrist splint to be worn when up.  Plain film x-rays per orders.  OTC analgesics as " needed.  Follow up in 2 weeks.prn.

## 2025-03-03 NOTE — TELEPHONE ENCOUNTER
Caller: Kimmy Griffin    Relationship to patient: Self    Best call back number:  Telephone Information:   Mobile [phone]         Patient is needing: PATIENT IS WANTING TO HAVE XRAY DONE AT [facility]. SHE IS GOING TO SELF PAY AND THAT IS CHEAPER THAN HER INSURANCE. PLEASE FAX ORDER TO   #[phone]

## 2025-04-24 DIAGNOSIS — E78.2 MIXED HYPERLIPIDEMIA: ICD-10-CM

## 2025-04-24 DIAGNOSIS — Z00.00 ANNUAL PHYSICAL EXAM: Primary | ICD-10-CM

## 2025-04-24 DIAGNOSIS — R73.03 PREDIABETES: ICD-10-CM

## 2025-04-26 DIAGNOSIS — E78.2 MIXED HYPERLIPIDEMIA: ICD-10-CM

## 2025-04-28 RX ORDER — ROSUVASTATIN CALCIUM 10 MG/1
10 TABLET, COATED ORAL
Qty: 90 TABLET | Refills: 0 | Status: SHIPPED | OUTPATIENT
Start: 2025-04-28

## 2025-07-11 ENCOUNTER — OFFICE VISIT (OUTPATIENT)
Dept: FAMILY MEDICINE CLINIC | Facility: CLINIC | Age: 50
End: 2025-07-11
Payer: COMMERCIAL

## 2025-07-11 VITALS
OXYGEN SATURATION: 98 % | BODY MASS INDEX: 36.88 KG/M2 | HEART RATE: 74 BPM | SYSTOLIC BLOOD PRESSURE: 130 MMHG | TEMPERATURE: 97.7 F | DIASTOLIC BLOOD PRESSURE: 80 MMHG | HEIGHT: 67 IN | WEIGHT: 235 LBS

## 2025-07-11 DIAGNOSIS — E78.2 MIXED HYPERLIPIDEMIA: ICD-10-CM

## 2025-07-11 DIAGNOSIS — K21.9 GASTROESOPHAGEAL REFLUX DISEASE, UNSPECIFIED WHETHER ESOPHAGITIS PRESENT: ICD-10-CM

## 2025-07-11 DIAGNOSIS — Z00.00 ANNUAL PHYSICAL EXAM: Primary | ICD-10-CM

## 2025-07-11 DIAGNOSIS — Z23 IMMUNIZATION DUE: ICD-10-CM

## 2025-07-11 DIAGNOSIS — R73.03 PREDIABETES: ICD-10-CM

## 2025-07-11 RX ORDER — OMEPRAZOLE 20 MG/1
20 CAPSULE, DELAYED RELEASE ORAL DAILY
Qty: 90 CAPSULE | Refills: 3 | Status: SHIPPED | OUTPATIENT
Start: 2025-07-11

## 2025-07-11 RX ORDER — ROSUVASTATIN CALCIUM 10 MG/1
10 TABLET, COATED ORAL
Qty: 90 TABLET | Refills: 3 | Status: SHIPPED | OUTPATIENT
Start: 2025-07-11

## 2025-07-11 NOTE — PATIENT INSTRUCTIONS
Weight Management:  Increase ounces of water intake to goal of 1/2 body weight per day.  Increase grams of protein intake to 1/2 body weight per day.    Limit sugar and carbohydrate intake.  Incorporate exercise into daily routine with goal of 150 minutes per week.        Antireflux measures and dietary modifications reviewed. Low acid diet reviewed. Keep head of bed elevated. Stop eating/drinking at least 3 hours prior to bedtime. Eliminate caffeine and carbonated beverages. Weight loss encouraged if BMI over 25.

## 2025-07-11 NOTE — PROGRESS NOTES
Chief Complaint   Patient presents with    Annual Exam    Hyperlipidemia       Patient Care Team:  Head, MARICRUZ Yang as PCP - General (Nurse Practitioner)    Subjective   Kimmy Griffin is a 50 y.o. female and is here for a yearly physical exam. The patient reports no problems.    History of Present Illness  The patient presents for a routine checkup.    She has been under significant stress due to her 's health issues, which has led to a decrease in her self-care over the past 2 months. This includes less physical activity and irregular eating habits. She is currently working from home as a . She has noticed a slight weight gain in recent months and an increase in her glucose levels. She enjoys walking and plans to incorporate more of it into her daily routine. She also intends to start using hand weights for exercise. She reports not drinking enough water and primarily consumes water and coffee. She is not experiencing any abdominal pain. She is taking melatonin and berberine sporadically.    She underwent a colonoscopy, during which polyps were discovered. She is now on a 3-year colonoscopy program. She experienced hard stools and occasional bleeding after constipation, which she believes was due to her aspirin use. Since discontinuing aspirin, she has not had any further bleeding. She is up-to-date with her Pap smears and mammograms. She went through early menopause in her 40s. She has a dental appointment scheduled for this month and typically visits the dentist twice a year. She plans to schedule an eye exam as she has been relying more on her reading glasses recently.    She continues to take omeprazole daily for indigestion, which she finds effective. However, she experienced some breakthrough symptoms last night after consuming ice cream late in the evening.    She sustained a left hand injury in 02/2025, which has improved but not fully healed. She still experiences discomfort in the  thumb area, particularly when it is pulled or snagged. She is left-handed but the injury has not affected her work. She reports no numbness or tingling in her fingers. She has not used topical Biofreeze or Voltaren gel for her hand. She has a brace but finds it bulky and does not wear it often.    She has noticed discoloration in her second toe, which she believes is due to jamming it. She thinks it is still healing.    She is taking Crestor for cholesterol management and eats avocado daily.    SOCIAL HISTORY  She works from home as a .    FAMILY HISTORY  Her father had polyps found during a colonoscopy.    Results  Labs   - White blood cell count: Normal   - Hemoglobin: Normal   - Glucose: 112 mg/dL   - Kidney function: Normal   - Liver function tests: Normal   - Total cholesterol: 193 mg/dL   - LDL: 112 mg/dL   - HDL: Increased   - Triglycerides: Slightly elevated   - Thyroid function test: Normal   - Urine test: Completely negative   - Hemoglobin A1c: Increased by 0.1 from last year    Health Habits:  Dental Exam: Up to date  Eye Exam: Not UTD.    Diet:  Trying to watch diet - difficult due to husbands illness.    Exercise: 3 times/week.  Current exercise activities include: walking  Pap:  All Women < 3 years. Early menopause.    Mammogram:  UTD.    Dexa: N/A    Colonoscopy: Op Note by Catalina Barney MD (01/09/2025 09:44)  Works from home as a .    The following portions of the patient's history were reviewed and updated as appropriate: allergies, current medications, past family history, past medical history, past social history, past surgical history, and problem list.    Social and Family and Surgical History reviewed and updated today, see Rooming tab.    Health History, Preventive Measures and Vaccination flow sheets reviewed and updated today.    Patient's current medical chart in Epic; including previous office notes, imaging, labs, specialist's evaluation either in  "notes or in Media tab reviewed today.    Other pertinent medical information also reviewed thru Care Everywhere function is also reviewed today.    Review of Systems  Review of Systems  Vitals:    07/11/25 1019   BP: 130/80   BP Location: Left arm   Patient Position: Sitting   Cuff Size: Large Adult   Pulse: 74   Temp: 97.7 °F (36.5 °C)   SpO2: 98%   Weight: 107 kg (235 lb)   Height: 170.2 cm (67\")     Wt Readings from Last 3 Encounters:   07/11/25 107 kg (235 lb)   03/03/25 107 kg (235 lb)   01/09/25 108 kg (238 lb)       Physical Exam  Vitals reviewed.   Constitutional:       Appearance: She is well-developed.   HENT:      Head: Normocephalic and atraumatic.      Right Ear: Tympanic membrane normal.      Left Ear: Tympanic membrane normal.   Eyes:      Pupils: Pupils are equal, round, and reactive to light.   Cardiovascular:      Rate and Rhythm: Normal rate and regular rhythm.      Heart sounds: Normal heart sounds. No murmur heard.  Pulmonary:      Effort: Pulmonary effort is normal.      Breath sounds: Normal breath sounds. No wheezing, rhonchi or rales.   Abdominal:      General: Bowel sounds are normal.      Palpations: Abdomen is soft.      Tenderness: There is no abdominal tenderness.   Musculoskeletal:         General: No tenderness. Normal range of motion.      Right hand: Normal.      Cervical back: Normal range of motion and neck supple.      Comments: Slight tenderness to left hand primarily to 1st and 2nd digit.  No current bruising or swelling.     Skin:     General: Skin is warm and dry.      Findings: No erythema or rash.   Neurological:      Mental Status: She is alert and oriented to person, place, and time.   Psychiatric:         Behavior: Behavior normal.           The 10-year ASCVD risk score (Moises SUÁREZ, et al., 2019) is: 2.2%    Values used to calculate the score:      Age: 50 years      Sex: Female      Is Non- : No      Diabetic: No      Tobacco smoker: No      " Systolic Blood Pressure: 130 mmHg      Is BP treated: No      HDL Cholesterol: 38 mg/dL      Total Cholesterol: 193 mg/dL     Results for orders placed or performed in visit on 05/02/25   CBC (No Diff)    Collection Time: 07/08/25  9:30 AM    Specimen: Blood   Result Value Ref Range    WBC 6.03 3.40 - 10.80 10*3/mm3    RBC 4.12 3.77 - 5.28 10*6/mm3    Hemoglobin 13.0 12.0 - 15.9 g/dL    Hematocrit 39.3 34.0 - 46.6 %    MCV 95.4 79.0 - 97.0 fL    MCH 31.6 26.6 - 33.0 pg    MCHC 33.1 31.5 - 35.7 g/dL    RDW 13.5 12.3 - 15.4 %    Platelets 219 140 - 450 10*3/mm3   Comprehensive Metabolic Panel    Collection Time: 07/08/25  9:30 AM    Specimen: Blood   Result Value Ref Range    Glucose 112 (H) 65 - 99 mg/dL    BUN 17.0 6.0 - 20.0 mg/dL    Creatinine 0.66 0.57 - 1.00 mg/dL    EGFR Result 105.7 >60.0 mL/min/1.73    BUN/Creatinine Ratio 25.8 (H) 7.0 - 25.0    Sodium 141 136 - 145 mmol/L    Potassium 4.4 3.5 - 5.2 mmol/L    Chloride 105 98 - 107 mmol/L    Total CO2 24.9 22.0 - 29.0 mmol/L    Calcium 9.4 8.6 - 10.5 mg/dL    Total Protein 6.6 6.0 - 8.5 g/dL    Albumin 4.5 3.5 - 5.2 g/dL    Globulin 2.1 gm/dL    A/G Ratio 2.1 g/dL    Total Bilirubin 0.4 0.0 - 1.2 mg/dL    Alkaline Phosphatase 81 39 - 117 U/L    AST (SGOT) 17 1 - 32 U/L    ALT (SGPT) 19 1 - 33 U/L   Lipid Panel With / Chol / HDL Ratio    Collection Time: 07/08/25  9:30 AM    Specimen: Blood   Result Value Ref Range    Total Cholesterol 193 0 - 200 mg/dL    Triglycerides 249 (H) 0 - 150 mg/dL    HDL Cholesterol 38 (L) 40 - 60 mg/dL    VLDL Cholesterol Javier 43 (H) 5 - 40 mg/dL    LDL Chol Calc (NIH) 112 (H) 0 - 100 mg/dL    Chol/HDL Ratio 5.08    TSH Rfx On Abnormal To Free T4    Collection Time: 07/08/25  9:30 AM    Specimen: Blood   Result Value Ref Range    TSH 2.830 0.270 - 4.200 uIU/mL   UA / M With / Rflx Culture([facility] ONLY) - Urine, Clean Catch    Collection Time: 07/08/25  9:30 AM    Specimen: Urine, Clean Catch   Result Value Ref Range    Specific  Gravity, UA 1.009 1.005 - 1.030    pH, UA 6.5 5.0 - 7.5    Color, UA Yellow Yellow    Appearance, UA Clear Clear    Leukocytes, UA Negative Negative    Protein Negative Negative/Trace    Glucose, UA Negative Negative    Ketones Negative Negative    Blood, UA Negative Negative    Bilirubin, UA Negative Negative    Urobilinogen, UA 0.2 0.2 - 1.0 mg/dL    Nitrite, UA Negative Negative    Microscopic Examination Comment     Microscopic Examination See below:     Urinalysis Reflex Comment    Hemoglobin A1c    Collection Time: 07/08/25  9:30 AM    Specimen: Blood   Result Value Ref Range    Hemoglobin A1C 5.90 (H) 4.80 - 5.60 %   Microscopic Examination -    Collection Time: 07/08/25  9:30 AM   Result Value Ref Range    WBC, UA None seen 0 - 5 /hpf    RBC, UA 0-2 0 - 2 /hpf    Epithelial Cells (non renal) None seen 0 - 10 /hpf    Casts None seen None seen /lpf    Bacteria, UA None seen None seen/Few     Assessment & Plan     Assessment & Plan  1. Health maintenance.  Her white blood cell count, hemoglobin, kidney function, liver function tests, and thyroid function tests are all within normal limits. Her urine test results were completely negative.  She is advised to maintain a healthy diet and regular exercise regimen, focusing on reducing sugar and carbohydrate intake. The goal is to achieve 150 minutes of physical activity per week. She should also aim to consume at least 64 ounces of water daily, gradually increasing to 100 ounces, and increase her protein intake to 100 grams per day.  A prescription for omeprazole will be provided. She is advised to take Pepcid before going out for dinner to prevent indigestion and heartburn. She should avoid lying down for at least 3 hours after eating to prevent these symptoms. The shingles vaccine will be deferred. The pneumonia vaccine will be administered today. If she experiences any issues, she should inform us immediately.    2. Left hand injury.  Her left hand injury is  gradually improving, which is a positive sign.  She is able to grasp objects and move her thumb to each of her fingers without experiencing numbness or tingling in her fingers. She reports stiffness in the thumb area but prefers to give it more time to heal before considering a referral to a hand specialist.  She is advised to rest her hand and apply an ice pack for about 10 to 15 minutes on days when it bothers her significantly. Topical Biofreeze or Voltaren gel can be applied to the affected area. If the hand splints too much, she can wrap it with an Ace wrap for additional support. If her condition worsens, she should contact us so that a referral to a hand specialist can be arranged.    3. Second toe discoloration.  The discoloration in her second toe appears to be due to a prominent vein.  She is advised to monitor the condition closely.    4. Prediabetes.  Her glucose level was 112, which could be due to increased sugar and carbohydrate intake.  Her hemoglobin A1c only increased by 0.1 from last year, keeping her in the prediabetic range. She is advised to work on diet and exercise, focusing on reducing sugar and carbohydrate intake.    5. Cholesterol management.  Her cholesterol levels have improved, with total cholesterol decreasing from 206 to 193, LDL from 135 to 112, and HDL increasing slightly. Triglycerides did go up a little along with glucose level.  She is advised to continue taking Crestor and maintain her current diet, which includes healthy oils like avocados.    6. Indigestion.  She continues to take omeprazole daily for indigestion, which she finds effective. However, she experienced some breakthrough symptoms last night after consuming ice cream late in the evening.  A prescription for omeprazole will be provided.    Follow-up  She will follow up in 1 year, with labs to be done a week prior to the appointment.    Diagnoses and all orders for this visit:    1. Annual physical exam (Primary)  -      CBC (No Diff); Future  -     Comprehensive Metabolic Panel; Future  -     Lipid Panel With / Chol / HDL Ratio; Future  -     TSH Rfx On Abnormal To Free T4; Future    2. Gastroesophageal reflux disease, unspecified whether esophagitis present  -     omeprazole (priLOSEC) 20 MG capsule; Take 1 capsule by mouth Daily.  Dispense: 90 capsule; Refill: 3    3. Immunization due  -     Pneumococcal Conjugate Vaccine 21 (18+ yrs)    4. Mixed hyperlipidemia  -     rosuvastatin (CRESTOR) 10 MG tablet; Take 1 tablet by mouth every night at bedtime.  Dispense: 90 tablet; Refill: 3  -     Lipid Panel With / Chol / HDL Ratio; Future    5. Prediabetes  -     Comprehensive Metabolic Panel; Future  -     Hemoglobin A1c; Future        1. Patient Counseling:  --Nutrition: Stressed importance of moderation in sodium/caffeine intake, saturated fat and cholesterol.  Discussed caloric balance, sufficient intake of fresh fruits, vegetables, fiber,    calcium, iron.  --Exercise: Stressed the importance of regular exercise.   --Substance Abuse: Discussed cessation/primary prevention of tobacco, alcohol, or other drug use; driving or other dangerous activities under the influence.    --Dental health: Discussed importance of regular tooth brushing, flossing, and dental visits.  --Suggested having eyes and vision checked if needed or past due.  --Immunizations reviewed.  --Discussed benefits of screening colonoscopy.  2. Discussed the patient's BMI with her.  The BMI is above average; BMI management plan is completed  3. Follow up next physical in 1 year    Patient was given instructions and counseling regarding condition or for health maintenance advice.  Please see specific information pulled into the AVS if appropriate.      Medications Discontinued During This Encounter   Medication Reason    omeprazole (priLOSEC) 20 MG capsule Reorder    rosuvastatin (CRESTOR) 10 MG tablet Reorder          Patient or patient representative verbalized  consent for the use of Ambient Listening during the visit with  MARICRUZ Helm for chart documentation. 7/11/2025  12:58 EDT    MARICRUZ Duncan  Family Practice  [facility] [place]

## (undated) DEVICE — THE SINGLE USE ETRAP – POLYP TRAP IS USED FOR SUCTION RETRIEVAL OF ENDOSCOPICALLY REMOVED POLYPS.: Brand: ETRAP

## (undated) DEVICE — ADAPT CLN BIOGUARD AIR/H2O DISP

## (undated) DEVICE — SNAR POLYP SENSATION STDOVL 27 240 BX40

## (undated) DEVICE — SENSR O2 OXIMAX FNGR A/ 18IN NONSTR

## (undated) DEVICE — CANN O2 ETCO2 FITS ALL CONN CO2 SMPL A/ 7IN DISP LF

## (undated) DEVICE — PATIENT RETURN ELECTRODE, SINGLE-USE, CONTACT QUALITY MONITORING, ADULT, WITH 9FT CORD, FOR PATIENTS WEIGING OVER 33LBS. (15KG): Brand: MEGADYNE

## (undated) DEVICE — TUBING, SUCTION, 1/4" X 10', STRAIGHT: Brand: MEDLINE

## (undated) DEVICE — KT ORCA ORCAPOD DISP STRL

## (undated) DEVICE — GOWN,SIRUS,NON REINFRCD,LARGE,SET IN SL: Brand: MEDLINE